# Patient Record
Sex: MALE | Race: WHITE | NOT HISPANIC OR LATINO
[De-identification: names, ages, dates, MRNs, and addresses within clinical notes are randomized per-mention and may not be internally consistent; named-entity substitution may affect disease eponyms.]

---

## 2017-06-30 ENCOUNTER — APPOINTMENT (OUTPATIENT)
Dept: GASTROENTEROLOGY | Facility: CLINIC | Age: 67
End: 2017-06-30
Payer: MEDICARE

## 2017-06-30 VITALS
HEART RATE: 55 BPM | OXYGEN SATURATION: 98 % | SYSTOLIC BLOOD PRESSURE: 136 MMHG | DIASTOLIC BLOOD PRESSURE: 72 MMHG | BODY MASS INDEX: 27 KG/M2 | WEIGHT: 172 LBS | HEIGHT: 67 IN | TEMPERATURE: 98.2 F

## 2017-06-30 DIAGNOSIS — Z12.11 ENCOUNTER FOR SCREENING FOR MALIGNANT NEOPLASM OF COLON: ICD-10-CM

## 2017-06-30 DIAGNOSIS — K62.5 HEMORRHAGE OF ANUS AND RECTUM: ICD-10-CM

## 2017-06-30 DIAGNOSIS — Z78.9 OTHER SPECIFIED HEALTH STATUS: ICD-10-CM

## 2017-06-30 DIAGNOSIS — Z86.010 PERSONAL HISTORY OF COLONIC POLYPS: ICD-10-CM

## 2017-06-30 DIAGNOSIS — Z86.39 PERSONAL HISTORY OF OTHER ENDOCRINE, NUTRITIONAL AND METABOLIC DISEASE: ICD-10-CM

## 2017-06-30 DIAGNOSIS — Z72.0 TOBACCO USE: ICD-10-CM

## 2017-06-30 DIAGNOSIS — Z81.8 FAMILY HISTORY OF OTHER MENTAL AND BEHAVIORAL DISORDERS: ICD-10-CM

## 2017-06-30 DIAGNOSIS — Z87.438 PERSONAL HISTORY OF OTHER DISEASES OF MALE GENITAL ORGANS: ICD-10-CM

## 2017-06-30 PROCEDURE — 99202 OFFICE O/P NEW SF 15 MIN: CPT

## 2017-06-30 RX ORDER — POLYETHYLENE GLYCOL-3350 AND ELECTROLYTES WITH FLAVOR PACK 240; 5.84; 2.98; 6.72; 22.72 G/278.26G; G/278.26G; G/278.26G; G/278.26G; G/278.26G
240 POWDER, FOR SOLUTION ORAL
Qty: 1 | Refills: 0 | Status: ACTIVE | COMMUNITY
Start: 2017-06-30 | End: 1900-01-01

## 2017-07-07 ENCOUNTER — APPOINTMENT (OUTPATIENT)
Dept: GASTROENTEROLOGY | Facility: AMBULATORY MEDICAL SERVICES | Age: 67
End: 2017-07-07

## 2017-07-13 ENCOUNTER — OTHER (OUTPATIENT)
Age: 67
End: 2017-07-13

## 2017-11-27 ENCOUNTER — APPOINTMENT (OUTPATIENT)
Dept: UROLOGY | Facility: CLINIC | Age: 67
End: 2017-11-27
Payer: MEDICARE

## 2017-11-27 VITALS
WEIGHT: 170 LBS | SYSTOLIC BLOOD PRESSURE: 148 MMHG | DIASTOLIC BLOOD PRESSURE: 88 MMHG | HEIGHT: 67 IN | HEART RATE: 70 BPM | OXYGEN SATURATION: 97 % | BODY MASS INDEX: 26.68 KG/M2

## 2017-11-27 DIAGNOSIS — M54.9 DORSALGIA, UNSPECIFIED: ICD-10-CM

## 2017-11-27 DIAGNOSIS — Z87.19 PERSONAL HISTORY OF OTHER DISEASES OF THE DIGESTIVE SYSTEM: ICD-10-CM

## 2017-11-27 DIAGNOSIS — Z81.8 FAMILY HISTORY OF OTHER MENTAL AND BEHAVIORAL DISORDERS: ICD-10-CM

## 2017-11-27 DIAGNOSIS — K64.9 UNSPECIFIED HEMORRHOIDS: ICD-10-CM

## 2017-11-27 DIAGNOSIS — F17.200 NICOTINE DEPENDENCE, UNSPECIFIED, UNCOMPLICATED: ICD-10-CM

## 2017-11-27 DIAGNOSIS — M25.50 PAIN IN UNSPECIFIED JOINT: ICD-10-CM

## 2017-11-27 DIAGNOSIS — R12 HEARTBURN: ICD-10-CM

## 2017-11-27 LAB
BILIRUB UR QL STRIP: NORMAL
CLARITY UR: CLEAR
COLLECTION METHOD: NORMAL
GLUCOSE UR-MCNC: NORMAL
HCG UR QL: 0.2 EU/DL
HGB UR QL STRIP.AUTO: NORMAL
KETONES UR-MCNC: NORMAL
LEUKOCYTE ESTERASE UR QL STRIP: NORMAL
NITRITE UR QL STRIP: NORMAL
PH UR STRIP: 5
PROT UR STRIP-MCNC: NORMAL
SP GR UR STRIP: 1.02

## 2017-11-27 PROCEDURE — 99214 OFFICE O/P EST MOD 30 MIN: CPT

## 2017-11-27 PROCEDURE — 81003 URINALYSIS AUTO W/O SCOPE: CPT | Mod: QW

## 2017-11-27 PROCEDURE — 51798 US URINE CAPACITY MEASURE: CPT

## 2017-11-29 LAB
APPEARANCE: ABNORMAL
BACTERIA UR CULT: NORMAL
BACTERIA: ABNORMAL
BILIRUBIN URINE: NEGATIVE
BLOOD URINE: ABNORMAL
COLOR: YELLOW
GLUCOSE QUALITATIVE U: NEGATIVE MG/DL
KETONES URINE: NEGATIVE
LEUKOCYTE ESTERASE URINE: NEGATIVE
MICROSCOPIC-UA: NORMAL
NITRITE URINE: NEGATIVE
PH URINE: 5.5
PROTEIN URINE: NEGATIVE MG/DL
RED BLOOD CELLS URINE: 2 /HPF
SPECIFIC GRAVITY URINE: 1.02
SQUAMOUS EPITHELIAL CELLS: 0 /HPF
URINE COMMENTS: NORMAL
UROBILINOGEN URINE: NEGATIVE MG/DL
WHITE BLOOD CELLS URINE: 0 /HPF

## 2018-05-11 ENCOUNTER — MESSAGE (OUTPATIENT)
Age: 68
End: 2018-05-11

## 2018-05-14 ENCOUNTER — APPOINTMENT (OUTPATIENT)
Dept: UROLOGY | Facility: CLINIC | Age: 68
End: 2018-05-14
Payer: MEDICARE

## 2018-05-14 PROCEDURE — 51798 US URINE CAPACITY MEASURE: CPT | Mod: 59

## 2018-05-14 PROCEDURE — 51741 ELECTRO-UROFLOWMETRY FIRST: CPT

## 2018-05-14 PROCEDURE — 99213 OFFICE O/P EST LOW 20 MIN: CPT

## 2018-06-04 ENCOUNTER — APPOINTMENT (OUTPATIENT)
Dept: UROLOGY | Facility: CLINIC | Age: 68
End: 2018-06-04
Payer: MEDICARE

## 2018-06-04 VITALS
HEART RATE: 76 BPM | BODY MASS INDEX: 26.68 KG/M2 | DIASTOLIC BLOOD PRESSURE: 89 MMHG | HEIGHT: 67 IN | SYSTOLIC BLOOD PRESSURE: 167 MMHG | WEIGHT: 170 LBS

## 2018-06-04 PROCEDURE — 99214 OFFICE O/P EST MOD 30 MIN: CPT

## 2018-11-29 ENCOUNTER — APPOINTMENT (OUTPATIENT)
Dept: UROLOGY | Facility: CLINIC | Age: 68
End: 2018-11-29
Payer: MEDICARE

## 2018-11-29 VITALS
WEIGHT: 170 LBS | HEIGHT: 67 IN | DIASTOLIC BLOOD PRESSURE: 85 MMHG | RESPIRATION RATE: 14 BRPM | SYSTOLIC BLOOD PRESSURE: 138 MMHG | HEART RATE: 73 BPM | BODY MASS INDEX: 26.68 KG/M2

## 2018-11-29 PROCEDURE — 99214 OFFICE O/P EST MOD 30 MIN: CPT

## 2018-11-29 RX ORDER — SILDENAFIL 20 MG/1
20 TABLET ORAL
Qty: 50 | Refills: 5 | Status: ACTIVE | COMMUNITY
Start: 2018-11-29 | End: 1900-01-01

## 2018-12-13 ENCOUNTER — APPOINTMENT (OUTPATIENT)
Dept: UROLOGY | Facility: CLINIC | Age: 68
End: 2018-12-13

## 2019-05-09 ENCOUNTER — APPOINTMENT (OUTPATIENT)
Dept: UROLOGY | Facility: CLINIC | Age: 69
End: 2019-05-09
Payer: MEDICARE

## 2019-05-09 VITALS
WEIGHT: 168 LBS | HEIGHT: 67 IN | HEART RATE: 67 BPM | RESPIRATION RATE: 14 BRPM | SYSTOLIC BLOOD PRESSURE: 129 MMHG | BODY MASS INDEX: 26.37 KG/M2 | DIASTOLIC BLOOD PRESSURE: 82 MMHG

## 2019-05-09 PROCEDURE — 99214 OFFICE O/P EST MOD 30 MIN: CPT

## 2019-05-14 NOTE — HISTORY OF PRESENT ILLNESS
[FreeTextEntry1] : 68 yo male presents for follow up. \par Taking Flomax- urinating well.  Urinates 3-4 x during day and nocturia of 1 x with reasonable flow.\par Denies dysuria, hematuria, lower abdominal or flank pain, fever, chills or rigors. \par Has Erectile dysfunction. Rates erections as 3/5. Reports normal libido. Uses Sildenafil 20 mg tabs.\par \par Initially seen for Elevated PSA. Patient from my previous practice.\par Has h/o BPH on Flomax and Elevated PSA s/p negative Prostate biopsy. \par No family history of Prostate cancer.\par

## 2019-05-14 NOTE — ASSESSMENT
[FreeTextEntry1] : Benign Prostatic Hyperplasia:\par Continue Flomax. \par \par Erectile dysfunction:\par Sildenafil PRN. \par \par Elevated PSA:\par PSA: 4.49(11/15)-->4.71(5/17)-->4.4(9/17)-->5.58(5/8/18)-->4.6(9/18)-->6.34(4/30/19). \par s/p negative Prostate biopsy in Jan 2016 for Elevated PSA of 4.91(12/15). \par MRI Prostate(5/23/18): no suspicious lesion. Prostate vol- 84 cc. Vol at the time of Prostate biopsy- 60 cc. \par Continue PSA monitoring. Will do PSA in months. If goes up further will do MRI Prostate.\par \par Return to office in 3 months or sooner if any issues.

## 2019-05-14 NOTE — LETTER BODY
[Sincerely,] : Sincerely, [Please see my note below.] : Please see my note below. [Dear  ___] : Dear  [unfilled], [FreeTextEntry3] : Sean Castro MD\par  of Urology\par [facility]\par \par Offices:\par [facility] @\par [address], [place] [address]\par and\par [address], [place] [address], [address]\par and\par [address] [address]\par \par TEL: [phone]\par FAX: [phone]

## 2019-08-09 LAB
PSA FREE FLD-MCNC: 28 %
PSA FREE SERPL-MCNC: 1.39 NG/ML
PSA SERPL-MCNC: 4.97 NG/ML

## 2019-11-07 ENCOUNTER — APPOINTMENT (OUTPATIENT)
Dept: UROLOGY | Facility: CLINIC | Age: 69
End: 2019-11-07
Payer: MEDICARE

## 2019-11-07 VITALS — RESPIRATION RATE: 14 BRPM | HEIGHT: 67 IN | BODY MASS INDEX: 21.5 KG/M2 | WEIGHT: 137 LBS | HEART RATE: 60 BPM

## 2019-11-07 PROCEDURE — 99214 OFFICE O/P EST MOD 30 MIN: CPT

## 2019-11-11 NOTE — LETTER BODY
[Courtesy Letter:] : I had the pleasure of seeing your patient, [unfilled], in my office today. [Dear  ___] : Dear  [unfilled], [Sincerely,] : Sincerely, [Please see my note below.] : Please see my note below. [FreeTextEntry3] : Sean Castro MD\par  of Urology\par [facility]\par \par Offices:\par [facility] @\par [address], [place] [address]\par and\par [address], [place] [address], [address]\par and\par [address] [address]\par \par TEL: [phone]\par FAX: [phone]

## 2019-11-11 NOTE — PHYSICAL EXAM
[Urethral Meatus] : meatus normal [Epididymis] : the epididymides were normal [Scrotum] : the scrotum was normal [Penis Abnormality] : normal circumcised penis [Testes Tenderness] : no tenderness of the testes [Testes Mass (___cm)] : there were no testicular masses [General Appearance - Well Developed] : well developed [Normal Appearance] : normal appearance [General Appearance - In No Acute Distress] : no acute distress [Abdomen Soft] : soft [Abdomen Tenderness] : non-tender [Abdomen Mass (___ Cm)] : no abdominal mass palpated [Costovertebral Angle Tenderness] : no ~M costovertebral angle tenderness [Skin Color & Pigmentation] : normal skin color and pigmentation [] : no respiratory distress [Oriented To Time, Place, And Person] : oriented to person, place, and time [Normal Station and Gait] : the gait and station were normal for the patient's age [No Focal Deficits] : no focal deficits [No Palpable Adenopathy] : no palpable adenopathy [FreeTextEntry1] : normal peripheral circulation

## 2019-11-11 NOTE — HISTORY OF PRESENT ILLNESS
[FreeTextEntry1] : 70 yo male presents for follow up. \par Taking Flomax- urinating well.  Urinates 3-4 x during day and nocturia of 1 x with reasonable flow.\par Denies dysuria, hematuria, lower abdominal or flank pain, fever, chills or rigors. \par Has Erectile dysfunction. Rates erections as 3/5. Reports normal libido\par Not using Sildenafil.\par \par Initially seen for Elevated PSA. Patient from my previous practice.\par Has h/o BPH on Flomax and Elevated PSA s/p negative Prostate biopsy. \par No family history of Prostate cancer.\par

## 2019-11-11 NOTE — ASSESSMENT
[FreeTextEntry1] : Benign Prostatic Hyperplasia:\par Discussed treatment options. \par Continue Flomax. \par \par Elevated PSA:\par Status post negative prostate biopsy Feb 2016. \par MRI Prostate((May 2018): \par Prostate volume- 84 cc. \par No suspicious lesion- PIRADS 1. \par PSA stable. Continue PSA monitoring. Repeat PSA In 6 months. \par \par Return to office in 6 months or sooner if any issues. \par \par

## 2020-06-11 LAB
PSA FREE FLD-MCNC: 28 %
PSA FREE SERPL-MCNC: 1.39 NG/ML
PSA SERPL-MCNC: 4.98 NG/ML

## 2020-06-22 ENCOUNTER — APPOINTMENT (OUTPATIENT)
Dept: UROLOGY | Facility: CLINIC | Age: 70
End: 2020-06-22
Payer: MEDICARE

## 2020-06-22 VITALS
HEIGHT: 66 IN | BODY MASS INDEX: 26.84 KG/M2 | OXYGEN SATURATION: 97 % | DIASTOLIC BLOOD PRESSURE: 91 MMHG | WEIGHT: 167 LBS | HEART RATE: 70 BPM | SYSTOLIC BLOOD PRESSURE: 171 MMHG | TEMPERATURE: 98 F

## 2020-06-22 PROCEDURE — 99213 OFFICE O/P EST LOW 20 MIN: CPT

## 2020-06-22 NOTE — LETTER BODY
[Dear  ___] : Dear  [unfilled], [Courtesy Letter:] : I had the pleasure of seeing your patient, [unfilled], in my office today. [Sincerely,] : Sincerely, [Please see my note below.] : Please see my note below. [FreeTextEntry3] : Sean Castro MD\par  of Urology\par [facility]\par \par Offices:\par The [facility] @\par [address], [place] [address]\par and\par [address] [facility], [place] [address], [address]\par and\par [address] [address]\par \par TEL: [phone]\par FAX: [phone]

## 2020-06-22 NOTE — PHYSICAL EXAM
[Normal Appearance] : normal appearance [General Appearance - In No Acute Distress] : no acute distress [Skin Color & Pigmentation] : normal skin color and pigmentation [FreeTextEntry1] : normal peripheral circulation [] : no respiratory distress [Normal Station and Gait] : the gait and station were normal for the patient's age [Oriented To Time, Place, And Person] : oriented to person, place, and time

## 2020-06-22 NOTE — HISTORY OF PRESENT ILLNESS
[FreeTextEntry1] : 69 yo male presents for follow up. \par Taking Flomax- urinating well.  Urinates 3-4 x during day and nocturia of 1 x with reasonable flow.\par Denies hesitancy, straining or urinary incontinence. \par Denies dysuria, hematuria, lower abdominal or flank pain, fever, chills or rigors. \par Not sexually active. Has Erectile dysfunction. Rates erections as 3/5. Reports normal libido\par \par Initially seen for Elevated PSA. Patient from my previous practice.\par Has h/o BPH on Flomax and Elevated PSA s/p negative Prostate biopsy. \par No family history of Prostate cancer.\par

## 2020-06-22 NOTE — ASSESSMENT
[FreeTextEntry1] : Benign Prostatic Hyperplasia:\par Continue Flomax. \par \par Elevated PSA:\par Status post negative prostate biopsy for elevated PSA of 4.91 in Feb 2016. \par MRI Prostate((May 2018): \par Prostate volume- 84 cc. \par No suspicious lesion- PIRADS 1. \par PSA stable. Continue PSA monitoring. Repeat PSA In 6 months. \par \par Return to office in 6 months or sooner if any issues- will do Uroflo and PVR.

## 2020-11-20 ENCOUNTER — LABORATORY RESULT (OUTPATIENT)
Age: 70
End: 2020-11-20

## 2020-12-01 ENCOUNTER — APPOINTMENT (OUTPATIENT)
Dept: UROLOGY | Facility: CLINIC | Age: 70
End: 2020-12-01
Payer: MEDICARE

## 2020-12-01 VITALS — HEART RATE: 66 BPM | SYSTOLIC BLOOD PRESSURE: 133 MMHG | DIASTOLIC BLOOD PRESSURE: 76 MMHG

## 2020-12-01 VITALS — TEMPERATURE: 97.7 F

## 2020-12-01 PROCEDURE — 99072 ADDL SUPL MATRL&STAF TM PHE: CPT

## 2020-12-01 PROCEDURE — 99214 OFFICE O/P EST MOD 30 MIN: CPT | Mod: 25

## 2020-12-01 PROCEDURE — 51798 US URINE CAPACITY MEASURE: CPT

## 2020-12-08 NOTE — ASSESSMENT
[FreeTextEntry1] : Benign Prostatic Hyperplasia:\par PVR: 0 ml. \par Discussed treatment options mainly: continued medical management with alpha blocker and or 5 alpha reductase inhibitor Vs Clean intermittent catheterization/Indwelling Berrios catheter Vs Surgery: Transurethral water vapor ablation/resection/vaporization of Prostate/Simple prostatectomy- open Vs robotic after appropriate work up.\par Hold off on Finasteride. \par Continue Flomax. \par \par Elevated PSA\par  PSA 3 months. Will inform results. If goes up will get MRI Prostate.\par \par Return to office in 6 months or sooner if any issues.

## 2020-12-08 NOTE — PHYSICAL EXAM
[Urethral Meatus] : meatus normal [Penis Abnormality] : normal circumcised penis [Scrotum] : the scrotum was normal [Epididymis] : the epididymides were normal [Testes Tenderness] : no tenderness of the testes [Testes Mass (___cm)] : there were no testicular masses [Normal Appearance] : normal appearance [General Appearance - In No Acute Distress] : no acute distress [Abdomen Soft] : soft [Abdomen Tenderness] : non-tender [Abdomen Mass (___ Cm)] : no abdominal mass palpated [Costovertebral Angle Tenderness] : no ~M costovertebral angle tenderness [Skin Color & Pigmentation] : normal skin color and pigmentation [FreeTextEntry1] : normal peripheral circulation [] : no respiratory distress [Oriented To Time, Place, And Person] : oriented to person, place, and time [Normal Station and Gait] : the gait and station were normal for the patient's age [No Focal Deficits] : no focal deficits

## 2020-12-08 NOTE — HISTORY OF PRESENT ILLNESS
[FreeTextEntry1] : 69 yo male presents for follow up. \par Taking Flomax. Urinates with reasonable stream, 3-4 x during day and nocturia of 1-2 x.\par Has off and on hesitancy, straining and intermittency .\par Denies dysuria, hematuria, lower abdominal or flank pain, fever, chills or rigors. \par Rates erections as 2/5, complaining of libido low \par Not sexually active. \par \par Initially seen for Elevated PSA. Patient from my previous practice.\par Has h/o BPH on Flomax and Elevated PSA s/p negative Prostate biopsy. \par No family history of Prostate cancer.\par

## 2020-12-08 NOTE — LETTER BODY
[Dear  ___] : Dear  [unfilled], [Courtesy Letter:] : I had the pleasure of seeing your patient, [unfilled], in my office today. [Please see my note below.] : Please see my note below. [Sincerely,] : Sincerely, [FreeTextEntry3] : Sean Castro MD\par  of Urology\par [facility]\par \par Offices:\par The [facility] @\par [address], [place] [address]\par and\par [address], [place] [address], [address]\par and\par [address] [address]\par \par TEL: [phone]\par FAX: [phone]

## 2021-02-22 ENCOUNTER — LABORATORY RESULT (OUTPATIENT)
Age: 71
End: 2021-02-22

## 2021-04-16 ENCOUNTER — LABORATORY RESULT (OUTPATIENT)
Age: 71
End: 2021-04-16

## 2021-04-21 ENCOUNTER — NON-APPOINTMENT (OUTPATIENT)
Age: 71
End: 2021-04-21

## 2021-06-01 ENCOUNTER — APPOINTMENT (OUTPATIENT)
Dept: UROLOGY | Facility: CLINIC | Age: 71
End: 2021-06-01

## 2021-06-15 ENCOUNTER — APPOINTMENT (OUTPATIENT)
Dept: UROLOGY | Facility: CLINIC | Age: 71
End: 2021-06-15
Payer: MEDICARE

## 2021-06-15 VITALS — TEMPERATURE: 97.8 F | HEART RATE: 60 BPM | SYSTOLIC BLOOD PRESSURE: 127 MMHG | DIASTOLIC BLOOD PRESSURE: 70 MMHG

## 2021-06-15 PROCEDURE — 99214 OFFICE O/P EST MOD 30 MIN: CPT

## 2021-06-16 LAB
APPEARANCE: CLEAR
BACTERIA: NEGATIVE
BILIRUBIN URINE: NEGATIVE
BLOOD URINE: NEGATIVE
CALCIUM OXALATE CRYSTALS: ABNORMAL
COLOR: NORMAL
GLUCOSE QUALITATIVE U: NEGATIVE
HYALINE CASTS: 0 /LPF
KETONES URINE: NEGATIVE
LEUKOCYTE ESTERASE URINE: NEGATIVE
MICROSCOPIC-UA: NORMAL
NITRITE URINE: NEGATIVE
PH URINE: 6
PROTEIN URINE: NEGATIVE
RED BLOOD CELLS URINE: 2 /HPF
SPECIFIC GRAVITY URINE: 1.02
SQUAMOUS EPITHELIAL CELLS: 0 /HPF
UROBILINOGEN URINE: NORMAL
WHITE BLOOD CELLS URINE: 1 /HPF

## 2021-06-17 LAB — BACTERIA UR CULT: NORMAL

## 2021-06-22 NOTE — PHYSICAL EXAM
[Normal Appearance] : normal appearance [General Appearance - In No Acute Distress] : no acute distress [Skin Color & Pigmentation] : normal skin color and pigmentation [FreeTextEntry1] : normal peripheral circulation [] : no respiratory distress [Oriented To Time, Place, And Person] : oriented to person, place, and time [Normal Station and Gait] : the gait and station were normal for the patient's age

## 2021-06-22 NOTE — ASSESSMENT
[FreeTextEntry1] : Reviewed outside records. \par Had Labs with [facility](6/8/21): \par PSA: 3.9\par Dip positive Microhematuria. \par \par Benign Prostatic Hyperplasia:\par Will get Urinalysis and Urine culture. \par \par Elevated PSA:\par MRI Prostate @ [facility](6/11/21):\par Prostate volume- 86 cc. \par No MRI suspicious lesion. PI-RADS 1. \par Total and Free PSA 1 week before next appointment. \par \par Return to office in 6 months or sooner if any issues- will do Uroflo/PVR.

## 2021-06-22 NOTE — HISTORY OF PRESENT ILLNESS
[FreeTextEntry1] : 70 yo male presents for follow up. \par Taking Flomax. Urinates with reasonable stream, 3-4 x during day and nocturia of 1-2 x.\par Has off and on hesitancy, straining and intermittency .\par Denies dysuria, hematuria, lower abdominal or flank pain, fever, chills or rigors. \par Rates erections as 2/5, complaining of libido low \par Not sexually active. \par Had MRI Prostate. \par \par Initially seen for Elevated PSA. Patient from my previous practice.\par Has h/o BPH on Flomax and Elevated PSA s/p negative Prostate biopsy. \par No family history of Prostate cancer.\par

## 2021-06-22 NOTE — LETTER BODY
[Dear  ___] : Dear  [unfilled], [Courtesy Letter:] : I had the pleasure of seeing your patient, [unfilled], in my office today. [Please see my note below.] : Please see my note below. [Sincerely,] : Sincerely, [FreeTextEntry3] : Sean Castro MD\par  of Urology\par [facility]\par \par Offices:\par [facility] @\par [address] [place], [place] [address]\par and\par [address], [place] [address], [address]\par and\par [address] [address]\par \par TEL: [phone]\par FAX: [phone]

## 2021-11-24 ENCOUNTER — LABORATORY RESULT (OUTPATIENT)
Age: 71
End: 2021-11-24

## 2021-11-30 ENCOUNTER — APPOINTMENT (OUTPATIENT)
Dept: UROLOGY | Facility: CLINIC | Age: 71
End: 2021-11-30
Payer: MEDICARE

## 2021-11-30 VITALS — TEMPERATURE: 98 F

## 2021-11-30 PROCEDURE — 51798 US URINE CAPACITY MEASURE: CPT

## 2021-11-30 PROCEDURE — 99214 OFFICE O/P EST MOD 30 MIN: CPT | Mod: 25

## 2021-11-30 NOTE — LETTER BODY
[Dear  ___] : Dear  [unfilled], [Courtesy Letter:] : I had the pleasure of seeing your patient, [unfilled], in my office today. [Please see my note below.] : Please see my note below. [Sincerely,] : Sincerely, [FreeTextEntry3] : Sean Castro MD\par  of Urology\par [facility]\par \par Offices:\par [facility] @\par [address], [place] [address]\par and\par [address] [facility], [place] [address], [address]\par and\par [address] [address]\par \par TEL: [phone]\par FAX: [phone]

## 2021-11-30 NOTE — PHYSICAL EXAM
[Urethral Meatus] : meatus normal [Penis Abnormality] : normal circumcised penis [Scrotum] : the scrotum was normal [Epididymis] : the epididymides were normal [Testes Tenderness] : no tenderness of the testes [Testes Mass (___cm)] : there were no testicular masses [Normal Appearance] : normal appearance [General Appearance - In No Acute Distress] : no acute distress [Abdomen Soft] : soft [Abdomen Tenderness] : non-tender [Costovertebral Angle Tenderness] : no ~M costovertebral angle tenderness [Skin Color & Pigmentation] : normal skin color and pigmentation [FreeTextEntry1] : normal peripheral circulation [] : no respiratory distress [Oriented To Time, Place, And Person] : oriented to person, place, and time [Normal Station and Gait] : the gait and station were normal for the patient's age [No Focal Deficits] : no focal deficits

## 2021-11-30 NOTE — HISTORY OF PRESENT ILLNESS
[FreeTextEntry1] : 72 yo male presents for follow up. \par Taking Flomax. Urinates with variable stream, 3-4 x during day and nocturia of 2-3 x.\par Has off and on hesitancy, straining and intermittency .\par Denies dysuria, hematuria, lower abdominal or flank pain, fever, chills or rigors. \par Rates erections as 2/5, complaining of libido low \par Not sexually active. \par \par \par Initially seen for Elevated PSA. Patient from my previous practice.\par Has h/o BPH on Flomax and Elevated PSA s/p negative Prostate biopsy. \par No family history of Prostate cancer.\par

## 2021-11-30 NOTE — ASSESSMENT
[FreeTextEntry1] : Benign Prostatic Hyperplasia: \par Discussed treatment options mainly: continued medical management with alpha blocker and or 5 alpha reductase inhibitor Vs Clean intermittent catheterization/Indwelling Berrios catheter Vs Surgery: Transurethral resection/vaporization/ablation of Prostate and Simple prostatectomy: open Vs robotic after appropriate work up.\par Also discussed emerging minimally invasive surgical therapies: Prostatic urethral lift (Urolift) and Water vapor thermal therapy (Rezum). \par Discussed risks and benefits of each.\par Will like to try Silodosin. \par Discussed similar side effect profile as Flomax with slightly increased incidence of retrograde ejaculation. \par \par Elevated PSA:\par Trended down. \par Will repeat in 6 months. \par \par Return to office in 3 months or sooner if any issues: will do Uroflo/PVR. \par \par \par

## 2022-03-01 ENCOUNTER — APPOINTMENT (OUTPATIENT)
Dept: UROLOGY | Facility: CLINIC | Age: 72
End: 2022-03-01
Payer: MEDICARE

## 2022-03-01 PROCEDURE — 51741 ELECTRO-UROFLOWMETRY FIRST: CPT

## 2022-03-01 PROCEDURE — 99213 OFFICE O/P EST LOW 20 MIN: CPT | Mod: 25

## 2022-03-01 PROCEDURE — 51798 US URINE CAPACITY MEASURE: CPT

## 2022-03-06 NOTE — PHYSICAL EXAM
[Normal Appearance] : normal appearance [General Appearance - In No Acute Distress] : no acute distress [FreeTextEntry1] : normal peripheral circulation [Oriented To Time, Place, And Person] : oriented to person, place, and time [] : no respiratory distress

## 2022-03-06 NOTE — ASSESSMENT
[FreeTextEntry1] : Elevated PSA:\par No new PSA. Will do PSA in May 2022.\par \par Benign Prostatic Hyperplasia:\par See Uroflo and PVR. Improved lower urinary tract symptoms and post void residual.\par Discussed treatment options. Will continue Silodosin. \par \par Return to office in 6 months or sooner if any issues: will do Uroflo/PVR.

## 2022-03-06 NOTE — HISTORY OF PRESENT ILLNESS
[FreeTextEntry1] : 73 yo male presents for follow up. \par Taking Silodosin, flow slightly better, 3-4 x during day and nocturia of 2 x.\par Denies dysuria, hematuria, lower abdominal or flank pain, fever, chills or rigors. \par Not sexually active. \par JOHNSON: 11/2021. \par \par In the past mentioned urinated with variable stream, 3-4 x during day and nocturia of 2-3 x.\par Had off and on hesitancy, straining and intermittency .\par Rated erections as 2/5, complained of libido low \par \par Initially seen for Elevated PSA. Patient from my previous practice.\par Has h/o BPH on Flomax and Elevated PSA s/p negative Prostate biopsy. \par No family history of Prostate cancer.\par

## 2022-04-29 ENCOUNTER — LABORATORY RESULT (OUTPATIENT)
Age: 72
End: 2022-04-29

## 2022-05-18 ENCOUNTER — APPOINTMENT (OUTPATIENT)
Dept: GASTROENTEROLOGY | Facility: CLINIC | Age: 72
End: 2022-05-18
Payer: MEDICARE

## 2022-05-18 DIAGNOSIS — Z86.010 PERSONAL HISTORY OF COLONIC POLYPS: ICD-10-CM

## 2022-05-18 PROCEDURE — 99214 OFFICE O/P EST MOD 30 MIN: CPT

## 2022-05-18 RX ORDER — SODIUM SULFATE, POTASSIUM SULFATE, MAGNESIUM SULFATE 17.5; 3.13; 1.6 G/ML; G/ML; G/ML
17.5-3.13-1.6 SOLUTION, CONCENTRATE ORAL
Qty: 1 | Refills: 0 | Status: ACTIVE | COMMUNITY
Start: 2022-05-18 | End: 1900-01-01

## 2022-05-18 NOTE — PHYSICAL EXAM
[General Appearance - Alert] : alert [General Appearance - In No Acute Distress] : in no acute distress [Sclera] : the sclera and conjunctiva were normal [Neck Appearance] : the appearance of the neck was normal [Auscultation Breath Sounds / Voice Sounds] : lungs were clear to auscultation bilaterally [Heart Rate And Rhythm] : heart rate was normal and rhythm regular [Heart Sounds] : normal S1 and S2 [Heart Sounds Gallop] : no gallops [Murmurs] : no murmurs [Heart Sounds Pericardial Friction Rub] : no pericardial rub [Full Pulse] : the pedal pulses are present [Edema] : there was no peripheral edema [Bowel Sounds] : normal bowel sounds [Abdomen Soft] : soft [Abdomen Tenderness] : non-tender [] : no hepato-splenomegaly [Abdomen Mass (___ Cm)] : no abdominal mass palpated [Normal Sphincter Tone] : normal sphincter tone [No Rectal Mass] : no rectal mass [Internal Hemorrhoid] : no internal hemorrhoids [External Hemorrhoid] : external hemorrhoids [Cervical Lymph Nodes Enlarged Posterior Bilaterally] : posterior cervical [Cervical Lymph Nodes Enlarged Anterior Bilaterally] : anterior cervical [Supraclavicular Lymph Nodes Enlarged Bilaterally] : supraclavicular [Axillary Lymph Nodes Enlarged Bilaterally] : axillary [Femoral Lymph Nodes Enlarged Bilaterally] : femoral [Inguinal Lymph Nodes Enlarged Bilaterally] : inguinal [No CVA Tenderness] : no ~M costovertebral angle tenderness [No Spinal Tenderness] : no spinal tenderness [Abnormal Walk] : normal gait [Nail Clubbing] : no clubbing  or cyanosis of the fingernails [Musculoskeletal - Swelling] : no joint swelling seen [Motor Tone] : muscle strength and tone were normal [Skin Color & Pigmentation] : normal skin color and pigmentation [Skin Turgor] : normal skin turgor [No Focal Deficits] : no focal deficits [Oriented To Time, Place, And Person] : oriented to person, place, and time [Impaired Insight] : insight and judgment were intact [Affect] : the affect was normal

## 2022-05-18 NOTE — CONSULT LETTER
[Dear  ___] : Dear  [unfilled], [Consult Letter:] : I had the pleasure of evaluating your patient, [unfilled]. [Please see my note below.] : Please see my note below. [Consult Closing:] : Thank you very much for allowing me to participate in the care of this patient.  If you have any questions, please do not hesitate to contact me. [Sincerely,] : Sincerely, [FreeTextEntry2] : Nilay Farley MD\par [address]\[place] [address]\par  [FreeTextEntry3] : Rivera Freeman MD\par

## 2022-05-18 NOTE — HISTORY OF PRESENT ILLNESS
[de-identified] : May 18, 2022 \par \par FELTON JONES, History of colon polyps\par \par Mr. ONUR URIBE 72 year is referred for colon cancer screening.  The patient denies any change in bowel movements, blood per rectum, abdominal, pelvic or rectal discomfort.   \par \par There is no family history of colon cancer or other gastrointestinal cancers.\par \par The patient denies any unexplained weight loss, fever chills or night sweats.\par \par There is no significant cardiac or pulmonary history.\par \par No complaints of chest pain, shortness of breath, palpitations, cough.\par \par The patient is feeling quite well.\par \par The patient is on no significant anticoagulant therapy or anti platelet therapy. \par \par No adverse reaction to anesthesia in the past.\par \par

## 2022-06-15 LAB — SARS-COV-2 N GENE NPH QL NAA+PROBE: NOT DETECTED

## 2022-06-16 ENCOUNTER — NON-APPOINTMENT (OUTPATIENT)
Age: 72
End: 2022-06-16

## 2022-06-16 ENCOUNTER — APPOINTMENT (OUTPATIENT)
Dept: GASTROENTEROLOGY | Facility: AMBULATORY MEDICAL SERVICES | Age: 72
End: 2022-06-16
Payer: MEDICARE

## 2022-06-16 DIAGNOSIS — Z86.19 PERSONAL HISTORY OF OTHER INFECTIOUS AND PARASITIC DISEASES: ICD-10-CM

## 2022-06-16 PROCEDURE — 45380 COLONOSCOPY AND BIOPSY: CPT | Mod: 33

## 2022-06-20 ENCOUNTER — NON-APPOINTMENT (OUTPATIENT)
Age: 72
End: 2022-06-20

## 2022-06-24 ENCOUNTER — NON-APPOINTMENT (OUTPATIENT)
Age: 72
End: 2022-06-24

## 2022-06-24 LAB
AFP-TM SERPL-MCNC: <1.8 NG/ML
ALBUMIN SERPL ELPH-MCNC: 4.6 G/DL
ALP BLD-CCNC: 111 U/L
ALT SERPL-CCNC: 26 U/L
AST SERPL-CCNC: 22 U/L
BASOPHILS # BLD AUTO: 0.05 K/UL
BASOPHILS NFR BLD AUTO: 0.6 %
BILIRUB DIRECT SERPL-MCNC: 0.2 MG/DL
BILIRUB INDIRECT SERPL-MCNC: 0.4 MG/DL
BILIRUB SERPL-MCNC: 0.6 MG/DL
EOSINOPHIL # BLD AUTO: 0.2 K/UL
EOSINOPHIL NFR BLD AUTO: 2.5 %
HCT VFR BLD CALC: 48.4 %
HCV RNA SERPL NAA+PROBE-LOG IU: NOT DETECTED LOGIU/ML
HEPC RNA INTERP: NOT DETECTED
HGB BLD-MCNC: 16.1 G/DL
IMM GRANULOCYTES NFR BLD AUTO: 0.4 %
INR PPP: 0.92 RATIO
LYMPHOCYTES # BLD AUTO: 2.98 K/UL
LYMPHOCYTES NFR BLD AUTO: 37.6 %
MAN DIFF?: NORMAL
MCHC RBC-ENTMCNC: 33.1 PG
MCHC RBC-ENTMCNC: 33.3 GM/DL
MCV RBC AUTO: 99.4 FL
MONOCYTES # BLD AUTO: 0.85 K/UL
MONOCYTES NFR BLD AUTO: 10.7 %
NEUTROPHILS # BLD AUTO: 3.81 K/UL
NEUTROPHILS NFR BLD AUTO: 48.2 %
PLATELET # BLD AUTO: 230 K/UL
PROT SERPL-MCNC: 7.1 G/DL
PT BLD: 10.8 SEC
RBC # BLD: 4.87 M/UL
RBC # FLD: 13.2 %
WBC # FLD AUTO: 7.92 K/UL

## 2022-08-29 ENCOUNTER — RX RENEWAL (OUTPATIENT)
Age: 72
End: 2022-08-29

## 2022-09-13 ENCOUNTER — APPOINTMENT (OUTPATIENT)
Dept: UROLOGY | Facility: CLINIC | Age: 72
End: 2022-09-13

## 2022-09-13 VITALS
WEIGHT: 162 LBS | DIASTOLIC BLOOD PRESSURE: 91 MMHG | TEMPERATURE: 97.7 F | BODY MASS INDEX: 26.15 KG/M2 | HEART RATE: 67 BPM | SYSTOLIC BLOOD PRESSURE: 166 MMHG

## 2022-09-13 PROCEDURE — 51741 ELECTRO-UROFLOWMETRY FIRST: CPT

## 2022-09-13 PROCEDURE — 99214 OFFICE O/P EST MOD 30 MIN: CPT | Mod: 25

## 2022-09-13 PROCEDURE — 51798 US URINE CAPACITY MEASURE: CPT

## 2022-09-16 LAB
PSA FREE FLD-MCNC: 25 %
PSA FREE SERPL-MCNC: 1.53 NG/ML
PSA SERPL-MCNC: 6.17 NG/ML

## 2022-09-18 NOTE — PHYSICAL EXAM
[Normal Appearance] : normal appearance [General Appearance - In No Acute Distress] : no acute distress [] : no respiratory distress [Oriented To Time, Place, And Person] : oriented to person, place, and time [FreeTextEntry1] : normal peripheral circulation

## 2022-09-18 NOTE — HISTORY OF PRESENT ILLNESS
[FreeTextEntry1] : 71 yo male presents for follow up. \par No new PSA.\par Taking Silodosin, flow slightly better, 3-4 x during day and nocturia of 2 x.\par Denies dysuria, hematuria, lower abdominal or flank pain, fever, chills or rigors. \par Not sexually active. \par JOHNSON: 11/2021. \par \par In the past mentioned urinated with variable stream, 3-4 x during day and nocturia of 2-3 x.\par Had off and on hesitancy, straining and intermittency .\par Rated erections as 2/5, complained of libido low \par \par Initially seen for Elevated PSA. Patient from my previous practice.\par Has h/o BPH on Flomax and Elevated PSA s/p negative Prostate biopsy. \par No family history of Prostate cancer.\par

## 2022-09-18 NOTE — ASSESSMENT
[FreeTextEntry1] : Elevated PSA:\par Total and Free PSA today and before follow up appointment. \par \par Benign Prostatic Hyperplasia:\par See Uroflo and PVR. \par Discussed treatment options, will continue Silodosin. \par \par Return to office in 6 months or sooner if any issues: will do Uroflo/PVR.

## 2023-02-27 ENCOUNTER — OFFICE (OUTPATIENT)
Dept: URBAN - METROPOLITAN AREA CLINIC 115 | Facility: CLINIC | Age: 73
Setting detail: OPHTHALMOLOGY
End: 2023-02-27
Payer: COMMERCIAL

## 2023-02-27 DIAGNOSIS — H25.11: ICD-10-CM

## 2023-02-27 DIAGNOSIS — H25.13: ICD-10-CM

## 2023-02-27 PROBLEM — H25.12 CATARACT SENILE NUCLEAR SCLEROSIS; RIGHT EYE, LEFT EYE, BOTH EYES: Status: ACTIVE | Noted: 2023-02-27

## 2023-02-27 PROCEDURE — 99204 OFFICE O/P NEW MOD 45 MIN: CPT | Performed by: OPHTHALMOLOGY

## 2023-02-27 PROCEDURE — 92136 OPHTHALMIC BIOMETRY: CPT | Performed by: OPHTHALMOLOGY

## 2023-02-27 ASSESSMENT — REFRACTION_AUTOREFRACTION
OS_CYLINDER: -1.75
OD_CYLINDER: -2.75
OS_AXIS: 056
OD_SPHERE: -2.00
OD_AXIS: 104
OS_SPHERE: 0.00

## 2023-02-27 ASSESSMENT — VISUAL ACUITY
OD_BCVA: 20/40-1
OS_BCVA: 20/150

## 2023-02-27 ASSESSMENT — SPHEQUIV_DERIVED
OS_SPHEQUIV: -0.875
OD_SPHEQUIV: -3.375

## 2023-02-27 ASSESSMENT — CONFRONTATIONAL VISUAL FIELD TEST (CVF)
OD_FINDINGS: FULL
OS_FINDINGS: FULL

## 2023-03-10 LAB
PSA FREE FLD-MCNC: 28 %
PSA FREE SERPL-MCNC: 1.59 NG/ML
PSA SERPL-MCNC: 5.75 NG/ML

## 2023-03-23 ENCOUNTER — ASC (OUTPATIENT)
Dept: URBAN - METROPOLITAN AREA SURGERY 8 | Facility: SURGERY | Age: 73
Setting detail: OPHTHALMOLOGY
End: 2023-03-23
Payer: COMMERCIAL

## 2023-03-23 DIAGNOSIS — H25.041: ICD-10-CM

## 2023-03-23 DIAGNOSIS — H25.11: ICD-10-CM

## 2023-03-23 PROCEDURE — 66984 XCAPSL CTRC RMVL W/O ECP: CPT | Performed by: OPHTHALMOLOGY

## 2023-03-24 ENCOUNTER — RX ONLY (RX ONLY)
Age: 73
End: 2023-03-24

## 2023-03-24 ENCOUNTER — OFFICE (OUTPATIENT)
Dept: URBAN - METROPOLITAN AREA CLINIC 115 | Facility: CLINIC | Age: 73
Setting detail: OPHTHALMOLOGY
End: 2023-03-24
Payer: COMMERCIAL

## 2023-03-24 DIAGNOSIS — H25.12: ICD-10-CM

## 2023-03-24 DIAGNOSIS — Z96.1: ICD-10-CM

## 2023-03-24 PROCEDURE — 99024 POSTOP FOLLOW-UP VISIT: CPT | Performed by: OPHTHALMOLOGY

## 2023-03-24 ASSESSMENT — REFRACTION_AUTOREFRACTION
OD_SPHERE: +2.00
OD_CYLINDER: -1.25
OS_CYLINDER: -2.00
OD_AXIS: 126
OS_AXIS: 052
OS_SPHERE: -0.25

## 2023-03-24 ASSESSMENT — CONFRONTATIONAL VISUAL FIELD TEST (CVF)
OD_FINDINGS: FULL
OS_FINDINGS: FULL

## 2023-03-24 ASSESSMENT — VISUAL ACUITY
OS_BCVA: 20/30-1
OD_BCVA: 20/30

## 2023-03-24 ASSESSMENT — SPHEQUIV_DERIVED
OD_SPHEQUIV: 1.375
OS_SPHEQUIV: -1.25

## 2023-04-07 ENCOUNTER — OFFICE (OUTPATIENT)
Dept: URBAN - METROPOLITAN AREA CLINIC 115 | Facility: CLINIC | Age: 73
Setting detail: OPHTHALMOLOGY
End: 2023-04-07
Payer: COMMERCIAL

## 2023-04-07 DIAGNOSIS — Z96.1: ICD-10-CM

## 2023-04-07 PROCEDURE — 99024 POSTOP FOLLOW-UP VISIT: CPT | Performed by: OPTOMETRIST

## 2023-04-07 ASSESSMENT — VISUAL ACUITY
OD_BCVA: 20/40
OS_BCVA: 20/40

## 2023-04-07 ASSESSMENT — REFRACTION_AUTOREFRACTION
OD_AXIS: 105
OD_SPHERE: +1.75
OS_AXIS: 056
OS_CYLINDER: -2.50
OD_CYLINDER: -2.75
OS_SPHERE: 0.00

## 2023-04-07 ASSESSMENT — SPHEQUIV_DERIVED
OD_SPHEQUIV: 0.375
OS_SPHEQUIV: -1.25

## 2023-04-27 ENCOUNTER — ASC (OUTPATIENT)
Dept: URBAN - METROPOLITAN AREA SURGERY 8 | Facility: SURGERY | Age: 73
Setting detail: OPHTHALMOLOGY
End: 2023-04-27
Payer: MEDICARE

## 2023-04-27 DIAGNOSIS — H25.12: ICD-10-CM

## 2023-04-27 PROCEDURE — 66984 XCAPSL CTRC RMVL W/O ECP: CPT | Performed by: OPHTHALMOLOGY

## 2023-04-28 ENCOUNTER — OFFICE (OUTPATIENT)
Dept: URBAN - METROPOLITAN AREA CLINIC 115 | Facility: CLINIC | Age: 73
Setting detail: OPHTHALMOLOGY
End: 2023-04-28
Payer: MEDICARE

## 2023-04-28 ENCOUNTER — RX ONLY (RX ONLY)
Age: 73
End: 2023-04-28

## 2023-04-28 DIAGNOSIS — Z96.1: ICD-10-CM

## 2023-04-28 PROCEDURE — 99024 POSTOP FOLLOW-UP VISIT: CPT | Performed by: OPTOMETRIST

## 2023-04-28 ASSESSMENT — CONFRONTATIONAL VISUAL FIELD TEST (CVF)
OD_FINDINGS: FULL
OS_FINDINGS: FULL

## 2023-04-28 ASSESSMENT — TONOMETRY: OD_IOP_MMHG: 19

## 2023-04-28 ASSESSMENT — CORNEAL EDEMA - FOLDS/STRIAE: OS_FOLDSSTRIAE: 1+

## 2023-04-28 ASSESSMENT — SPHEQUIV_DERIVED: OD_SPHEQUIV: 0.875

## 2023-04-28 ASSESSMENT — REFRACTION_AUTOREFRACTION
OD_CYLINDER: -2.25
OS_SPHERE: UTP
OD_SPHERE: +2.00
OD_AXIS: 101

## 2023-04-28 ASSESSMENT — VISUAL ACUITY
OS_BCVA: 20/30-2
OD_BCVA: 20/100

## 2023-05-02 ENCOUNTER — APPOINTMENT (OUTPATIENT)
Dept: UROLOGY | Facility: CLINIC | Age: 73
End: 2023-05-02
Payer: MEDICARE

## 2023-05-02 VITALS
HEIGHT: 67 IN | DIASTOLIC BLOOD PRESSURE: 77 MMHG | BODY MASS INDEX: 25.43 KG/M2 | TEMPERATURE: 98 F | HEART RATE: 71 BPM | WEIGHT: 162 LBS | RESPIRATION RATE: 14 BRPM | SYSTOLIC BLOOD PRESSURE: 128 MMHG

## 2023-05-02 DIAGNOSIS — N52.9 MALE ERECTILE DYSFUNCTION, UNSPECIFIED: ICD-10-CM

## 2023-05-02 DIAGNOSIS — N39.41 URGE INCONTINENCE: ICD-10-CM

## 2023-05-02 PROCEDURE — 99214 OFFICE O/P EST MOD 30 MIN: CPT

## 2023-05-05 ENCOUNTER — OFFICE (OUTPATIENT)
Dept: URBAN - METROPOLITAN AREA CLINIC 115 | Facility: CLINIC | Age: 73
Setting detail: OPHTHALMOLOGY
End: 2023-05-05
Payer: MEDICARE

## 2023-05-05 DIAGNOSIS — Z96.1: ICD-10-CM

## 2023-05-05 LAB
APPEARANCE: CLEAR
BILIRUBIN URINE: NEGATIVE
BLOOD URINE: NEGATIVE
COLOR: YELLOW
GLUCOSE QUALITATIVE U: NEGATIVE MG/DL
KETONES URINE: NEGATIVE MG/DL
LEUKOCYTE ESTERASE URINE: NEGATIVE
NITRITE URINE: NEGATIVE
PH URINE: 6
PROTEIN URINE: NEGATIVE MG/DL
SPECIFIC GRAVITY URINE: 1.01
UROBILINOGEN URINE: 0.2 MG/DL

## 2023-05-05 PROCEDURE — 99024 POSTOP FOLLOW-UP VISIT: CPT | Performed by: OPTOMETRIST

## 2023-05-05 ASSESSMENT — CORNEAL EDEMA - FOLDS/STRIAE: OS_FOLDSSTRIAE: ABSENT

## 2023-05-05 ASSESSMENT — REFRACTION_AUTOREFRACTION
OS_AXIS: 088
OS_SPHERE: +1.25
OS_CYLINDER: -1.25
OD_CYLINDER: -2.25
OD_SPHERE: +1.75
OD_AXIS: 105

## 2023-05-05 ASSESSMENT — VISUAL ACUITY
OD_BCVA: 20/25
OS_BCVA: 20/30-

## 2023-05-05 ASSESSMENT — TONOMETRY: OD_IOP_MMHG: 19

## 2023-05-05 ASSESSMENT — SPHEQUIV_DERIVED
OS_SPHEQUIV: 0.625
OD_SPHEQUIV: 0.625

## 2023-05-08 PROBLEM — N52.9 ERECTILE DYSFUNCTION: Status: ACTIVE | Noted: 2017-06-30

## 2023-05-08 PROBLEM — N52.9 ORGANIC ERECTILE DYSFUNCTION: Noted: 2017-11-27

## 2023-05-08 PROBLEM — N39.41 URGE INCONTINENCE: Status: ACTIVE | Noted: 2023-05-08

## 2023-05-08 NOTE — LETTER BODY
[Dear  ___] : Dear  [unfilled], [Courtesy Letter:] : I had the pleasure of seeing your patient, [unfilled], in my office today. [Please see my note below.] : Please see my note below. [Sincerely,] : Sincerely, [FreeTextEntry3] : Sean Castro MD\par  of Urology\par [facility]\par \par [facility]\par Offices:\par [address] [facility], [facility]\par [address] [address]\par [address] [facility], [address]\par \par TEL: [phone]\par FAX: [phone]

## 2023-05-08 NOTE — ASSESSMENT
[FreeTextEntry1] : Reviewed records.\par Discussed labs. \par \par Benign Prostatic Hyperplasia:\par PVR: 1st 310 ml, 2nd 50 ml. \par Discussed treatment options, will continue Silodosin. \par \par Urge incontinence:\par Gave samples of Myrbetriq. \par Explained common side effects: HTN, urinary retention, nasopharyngitis, headache, tachycardia. \par Asked to update us in a few weeks. If helps will do prescription. \par \par Elevated PSA:\par PSA stable. Continue PSA monitoring every 6 months. \par \par Return to office in 3 months or sooner if any issues: will do Uroflo/PVR.

## 2023-05-08 NOTE — HISTORY OF PRESENT ILLNESS
[FreeTextEntry1] : 74 yo male presents for follow up. \par No new PSA.\par Taking Silodosin, has reasonable stream, daytime frequency every 1 to 2 hours 3-4 x and nocturia of 2 to 3 x.\par Has on and off urgency and urinary incontinence 1 x day. \par Denies hesitancy, straining, intermittency and sense of incomplete emptying. \par Denies dysuria, hematuria, lower abdominal or flank pain, fever, chills or rigors. \par Not sexually active. \par \par MRI Prostate(June 2021) for Elevated PSA: 6.27/33%. \par Prostate volume: 86 cc. \par No MRI suspicious lesions. PIRADS 1. \par \par JOHNSON: Today, 5/2/23. \par \par \par In the past mentioned urinated with variable stream, 3-4 x during day and nocturia of 2-3 x.\par Had off and on hesitancy, straining and intermittency .\par Rated erections as 2/5, complained of libido low \par \par Initially seen for Elevated PSA. Patient from my previous practice.\par Has h/o BPH on Flomax and Elevated PSA s/p negative Prostate biopsy. \par No family history of Prostate cancer.\par

## 2023-05-08 NOTE — PHYSICAL EXAM
[Urethral Meatus] : meatus normal [Penis Abnormality] : normal circumcised penis [Scrotum] : the scrotum was normal [Epididymis] : the epididymides were normal [Testes Tenderness] : no tenderness of the testes [Testes Mass (___cm)] : there were no testicular masses [Normal Appearance] : normal appearance [General Appearance - In No Acute Distress] : no acute distress [Abdomen Soft] : soft [Abdomen Tenderness] : non-tender [] : no respiratory distress [Oriented To Time, Place, And Person] : oriented to person, place, and time [Normal Station and Gait] : the gait and station were normal for the patient's age [FreeTextEntry1] : normal peripheral circulation

## 2023-05-26 ENCOUNTER — OFFICE (OUTPATIENT)
Dept: URBAN - METROPOLITAN AREA CLINIC 115 | Facility: CLINIC | Age: 73
Setting detail: OPHTHALMOLOGY
End: 2023-05-26
Payer: MEDICARE

## 2023-05-26 DIAGNOSIS — H52.4: ICD-10-CM

## 2023-05-26 DIAGNOSIS — Z96.1: ICD-10-CM

## 2023-05-26 PROCEDURE — 99024 POSTOP FOLLOW-UP VISIT: CPT | Performed by: OPTOMETRIST

## 2023-05-26 ASSESSMENT — REFRACTION_MANIFEST
OS_CYLINDER: -1.00
OD_CYLINDER: -1.25
OS_SPHERE: +0.75
OS_AXIS: 084
OS_VA1: 20/20
OD_VA1: 20/20-1
OD_AXIS: 102
OD_SPHERE: +1.00
OD_ADD: +3.00
OS_ADD: +3.00

## 2023-05-26 ASSESSMENT — REFRACTION_AUTOREFRACTION
OD_CYLINDER: -2.00
OS_CYLINDER: -1.25
OD_AXIS: 102
OD_SPHERE: +1.50
OS_SPHERE: +1.00
OS_AXIS: 084

## 2023-05-26 ASSESSMENT — CORNEAL EDEMA - FOLDS/STRIAE: OS_FOLDSSTRIAE: ABSENT

## 2023-05-26 ASSESSMENT — TONOMETRY
OS_IOP_MMHG: 17
OD_IOP_MMHG: 19

## 2023-05-26 ASSESSMENT — VISUAL ACUITY
OS_BCVA: 20/30+1
OD_BCVA: 20/25-1

## 2023-05-26 ASSESSMENT — SPHEQUIV_DERIVED
OS_SPHEQUIV: 0.25
OD_SPHEQUIV: 0.375
OS_SPHEQUIV: 0.375
OD_SPHEQUIV: 0.5

## 2023-08-29 ENCOUNTER — APPOINTMENT (OUTPATIENT)
Dept: UROLOGY | Facility: CLINIC | Age: 73
End: 2023-08-29
Payer: MEDICARE

## 2023-08-29 PROCEDURE — 99214 OFFICE O/P EST MOD 30 MIN: CPT

## 2023-08-29 RX ORDER — SILODOSIN 8 MG/1
8 CAPSULE ORAL
Qty: 90 | Refills: 3 | Status: ACTIVE | COMMUNITY
Start: 2021-11-30 | End: 1900-01-01

## 2023-08-30 NOTE — HISTORY OF PRESENT ILLNESS
[FreeTextEntry1] : 8/29/2023:  73 year old male presents for follow up.  Taking silodosin, has reasonable stream, daytime frequency every 1-2 hours in the morning and then 3 to 4 times and nocturnal 2 to 3 times.  Has on and off urgency and urine continence once a day.  No new PSA.  Digital rectal examination was done on 5/2/23.  PSA: 6.27/33% (4/16/2021).  MRI Prostate (June 2021).  Prostate volume: 86 cc.  No MRI suspicious lesions. PIRADS 1.   In the past mentioned urinated with variable stream, 3-4 x during day and nocturia of 2-3 x. Had off and on hesitancy, straining and intermittency . Rated erections as 2/5, complained of libido low   Initially seen for Elevated PSA. Patient from my previous practice. Has h/o BPH on Flomax and Elevated PSA s/p negative Prostate biopsy.  No family history of Prostate cancer.

## 2023-08-30 NOTE — END OF VISIT
[FreeTextEntry3] : Documented by Macey Welch acting as a scribe for Dr. Sean Castro. 8/29/23   All medical record entries made by the Scribe were at my, Dr. Sean Castro, direction and personally dictated by me on 8/29/23. I have reviewed the chart and agree that the record accurately reflects my personal performance of the history, physical exam, assessment and plan. I have also personally directed, reviewed, and agreed with the chart. [Time Spent: ___ minutes] : I have spent [unfilled] minutes of time on the encounter.

## 2023-08-30 NOTE — LETTER BODY
[Dear  ___] : Dear  [unfilled], [Courtesy Letter:] : I had the pleasure of seeing your patient, [unfilled], in my office today. [Please see my note below.] : Please see my note below. [Sincerely,] : Sincerely, [FreeTextEntry3] : Sean Castro MD  of Urology [facility]  [facility] Offices: [address] [facility], [address] [place] [address], [facility], [facility] [address] [facility], [place] [address]  TEL: [phone] FAX: [phone]

## 2023-09-07 LAB
PSA FREE FLD-MCNC: 26 %
PSA FREE SERPL-MCNC: 1.56 NG/ML
PSA SERPL-MCNC: 5.92 NG/ML

## 2024-02-06 ENCOUNTER — LABORATORY RESULT (OUTPATIENT)
Age: 74
End: 2024-02-06

## 2024-02-27 ENCOUNTER — APPOINTMENT (OUTPATIENT)
Dept: UROLOGY | Facility: CLINIC | Age: 74
End: 2024-02-27
Payer: MEDICARE

## 2024-02-27 VITALS
HEIGHT: 67 IN | DIASTOLIC BLOOD PRESSURE: 107 MMHG | WEIGHT: 155 LBS | HEART RATE: 81 BPM | BODY MASS INDEX: 24.33 KG/M2 | SYSTOLIC BLOOD PRESSURE: 168 MMHG

## 2024-02-27 PROCEDURE — 99214 OFFICE O/P EST MOD 30 MIN: CPT

## 2024-02-27 NOTE — END OF VISIT
[Time Spent: ___ minutes] : I have spent [unfilled] minutes of time on the encounter. [FreeTextEntry3] : Documented by Macey Welch acting as a scribe for Dr. Sean Castro. 8/29/23   All medical record entries made by the Scribe were at my, Dr. Sean Castro, direction and personally dictated by me on 8/29/23. I have reviewed the chart and agree that the record accurately reflects my personal performance of the history, physical exam, assessment and plan. I have also personally directed, reviewed, and agreed with the chart.

## 2024-03-03 NOTE — LETTER BODY
[Dear  ___] : Dear  [unfilled], [Courtesy Letter:] : I had the pleasure of seeing your patient, [unfilled], in my office today. [Please see my note below.] : Please see my note below. [Sincerely,] : Sincerely, [FreeTextEntry3] : Sean Castro MD  of Urology [facility]  The [facility] Offices: [address] [facility], [address] [place] [address], [facility] [address] [facility], [place] [address]  TEL: [phone] FAX: [phone]

## 2024-03-03 NOTE — HISTORY OF PRESENT ILLNESS
[FreeTextEntry1] : Primary care physician: Dr Nilay Farley, [facility].   73-year-old male presents for follow up.  Taking Silodosin, has reasonable stream, daytime frequency every 1-2 hours in the morning and then 3 to 4 times and nocturia 2 to 3 times.  Endorses on and off intermittency and straining.  Has on and off urgency and urinary incontinence. Denies dysuria, hematuria, lower abdominal or flank pain, nausea, vomiting, fever, chills or rigors.  Not sexually active.   Status post negative Prostate biopsy in Jan 2016 for elevated PSA of 4.91(12/15).  PSA: 6.27/33% (4/16/2021).  MRI Prostate (June 2021).  Prostate volume: 86 cc.  No MRI suspicious lesions. PIRADS 1.   JOHNSON: 5/2/23.  Initially seen for Elevated PSA. Patient from my previous practice. Has h/o BPH on Flomax and Elevated PSA s/p negative Prostate biopsy.  No family history of Prostate cancer.

## 2024-03-03 NOTE — ASSESSMENT
[FreeTextEntry1] : Reviewed records. Discussed labs and imaging.   Elevated PSA: Will get MRI Prostate for possible prostate biopsy planning.   Benign Prostatic Hyperplasia: Discussed treatment options: continued medical management with alpha blocker and or 5 alpha reductase inhibitors with or without Clean intermittent catheterization/Indwelling Berrios catheter Vs Surgery: Transurethral resection/vaporization/ablation including Aquablation of Prostate and Simple prostatectomy: open Vs robotic after appropriate work up. Also discussed emerging minimally invasive surgical therapies: Prostatic urethral lift (Urolift), Water vapor thermal therapy (Rezum) and Transurethral anterior prostate commissurotomy and drug delivery (Optilume).  Discussed risks and benefits of each. Patient will consider his options. Would like to proceed with further work up.  Will continue Sildenafil for now.    Return to office after MRI: will do uroflow and check post void residual and Cystoscopy.

## 2024-04-02 ENCOUNTER — APPOINTMENT (OUTPATIENT)
Dept: UROLOGY | Facility: CLINIC | Age: 74
End: 2024-04-02
Payer: MEDICARE

## 2024-04-02 VITALS
SYSTOLIC BLOOD PRESSURE: 118 MMHG | HEIGHT: 67 IN | HEART RATE: 62 BPM | DIASTOLIC BLOOD PRESSURE: 75 MMHG | BODY MASS INDEX: 24.33 KG/M2 | WEIGHT: 155 LBS

## 2024-04-02 DIAGNOSIS — N13.8 BENIGN PROSTATIC HYPERPLASIA WITH LOWER URINARY TRACT SYMPMS: ICD-10-CM

## 2024-04-02 DIAGNOSIS — N40.1 BENIGN PROSTATIC HYPERPLASIA WITH LOWER URINARY TRACT SYMPMS: ICD-10-CM

## 2024-04-02 DIAGNOSIS — R97.20 ELEVATED PROSTATE, SPECIFIC ANTIGEN [PSA]: ICD-10-CM

## 2024-04-02 PROCEDURE — 99214 OFFICE O/P EST MOD 30 MIN: CPT

## 2024-04-08 PROBLEM — N40.1 BPH WITH OBSTRUCTION/LOWER URINARY TRACT SYMPTOMS: Status: ACTIVE | Noted: 2017-11-27

## 2024-04-08 NOTE — HISTORY OF PRESENT ILLNESS
[FreeTextEntry1] : Primary care physician: Dr Nilay Farley, [facility].   74-year-old male presents for cystoscopy. Patient considered his options and would like to continue medical therapy for now. Had MRI prostate.  Seen on 2/27/2024. On Silodosin, had reasonable stream, daytime frequency every 1-2 hours in the morning and then 3 to 4 times and nocturia 2 to 3 times.  Endorsed on and off intermittency and straining.  Had on and off urgency and urinary incontinence. Denied dysuria, hematuria, lower abdominal or flank pain, nausea, vomiting, fever, chills or rigors.  Not sexually active.   PSA: 6.27/33% (4/16/2021).  MRI Prostate (June 2021).  Prostate volume: 86 cc.  No MRI suspicious lesions. PIRADS 1.   Status post negative Prostate biopsy in Jan 2016 for elevated PSA of 4.91 (12/15).  JOHNSON: 5/2/23.  Initially seen for Elevated PSA. Patient from my previous practice. Has history of BPH on Flomax and Elevated PSA status post negative Prostate biopsy.  No family history of Prostate cancer.

## 2024-04-08 NOTE — LETTER BODY
[Dear  ___] : Dear  [unfilled], [Courtesy Letter:] : I had the pleasure of seeing your patient, [unfilled], in my office today. [Please see my note below.] : Please see my note below. [Sincerely,] : Sincerely, [FreeTextEntry3] : Sean Castro MD  of Urology [facility]  [facility] Offices: [address] [facility], [address] [place] [address], [facility] [address] [facility], [place] [address]  TEL: [phone] FAX: [phone]

## 2024-04-08 NOTE — ASSESSMENT
[FreeTextEntry1] : Reviewed records. Discussed labs and imaging.   Elevated PSA: PSA: 6.03/26% (2/6/2024). MRI Prostate (3/8/2024): Prostate volume: 93 cc. No findings suspicious for clinically significant prostate cancer.  PIRADS 1. Will continue PSA monitoring: Total and Free PSA 1 week before next appointment.   Benign Prostatic Hyperplasia: Discussed treatment options. Will continue Silodosin for now and possibly get Optilume in the future.  Will hold off on cystoscopy for now.   Return to office in 6 months or sooner if any issues: will do uroflow and check post void residual.

## 2024-09-26 ENCOUNTER — LABORATORY RESULT (OUTPATIENT)
Age: 74
End: 2024-09-26

## 2024-10-08 DIAGNOSIS — R97.20 ELEVATED PROSTATE, SPECIFIC ANTIGEN [PSA]: ICD-10-CM

## 2024-10-22 ENCOUNTER — OFFICE (OUTPATIENT)
Dept: URBAN - METROPOLITAN AREA CLINIC 115 | Facility: CLINIC | Age: 74
Setting detail: OPHTHALMOLOGY
End: 2024-10-22
Payer: MEDICARE

## 2024-10-22 DIAGNOSIS — Z96.1: ICD-10-CM

## 2024-10-22 DIAGNOSIS — H18.503: ICD-10-CM

## 2024-10-22 PROCEDURE — 92014 COMPRE OPH EXAM EST PT 1/>: CPT | Performed by: OPHTHALMOLOGY

## 2024-10-22 ASSESSMENT — REFRACTION_MANIFEST
OS_VA1: 20/20
OD_CYLINDER: -1.25
OS_AXIS: 084
OS_CYLINDER: -1.00
OD_VA1: 20/20-1
OS_ADD: +3.00
OD_AXIS: 102
OS_SPHERE: +0.75
OD_SPHERE: +1.00
OD_ADD: +3.00

## 2024-10-22 ASSESSMENT — REFRACTION_CURRENTRX
OS_OVR_VA: 20/
OD_CYLINDER: -2.00
OS_ADD: +2.00
OD_SPHERE: +1.50
OD_AXIS: 107
OD_ADD: +2.00
OS_AXIS: 087
OS_SPHERE: +0.75
OS_VPRISM_DIRECTION: PROGS
OD_VPRISM_DIRECTION: PROGS
OD_OVR_VA: 20/
OS_CYLINDER: -2.00

## 2024-10-22 ASSESSMENT — REFRACTION_AUTOREFRACTION
OD_AXIS: 103
OD_SPHERE: +1.00
OD_CYLINDER: -1.50
OS_CYLINDER: -1.75
OS_SPHERE: +1.00
OS_AXIS: 075

## 2024-10-22 ASSESSMENT — CONFRONTATIONAL VISUAL FIELD TEST (CVF)
OS_FINDINGS: FULL
OD_FINDINGS: FULL

## 2024-10-22 ASSESSMENT — VISUAL ACUITY
OD_BCVA: 20/25-1
OS_BCVA: 2025-2

## 2024-10-22 ASSESSMENT — TONOMETRY
OS_IOP_MMHG: 16
OD_IOP_MMHG: 17

## 2024-12-03 ENCOUNTER — APPOINTMENT (OUTPATIENT)
Dept: UROLOGY | Facility: CLINIC | Age: 74
End: 2024-12-03
Payer: MEDICARE

## 2024-12-03 VITALS
HEART RATE: 67 BPM | SYSTOLIC BLOOD PRESSURE: 160 MMHG | BODY MASS INDEX: 23.18 KG/M2 | DIASTOLIC BLOOD PRESSURE: 84 MMHG | WEIGHT: 148 LBS

## 2024-12-03 DIAGNOSIS — R97.20 ELEVATED PROSTATE, SPECIFIC ANTIGEN [PSA]: ICD-10-CM

## 2024-12-03 DIAGNOSIS — N40.1 BENIGN PROSTATIC HYPERPLASIA WITH LOWER URINARY TRACT SYMPMS: ICD-10-CM

## 2024-12-03 DIAGNOSIS — N13.8 BENIGN PROSTATIC HYPERPLASIA WITH LOWER URINARY TRACT SYMPMS: ICD-10-CM

## 2024-12-03 PROCEDURE — 51741 ELECTRO-UROFLOWMETRY FIRST: CPT

## 2024-12-03 PROCEDURE — 99214 OFFICE O/P EST MOD 30 MIN: CPT | Mod: 25

## 2025-06-10 ENCOUNTER — APPOINTMENT (OUTPATIENT)
Dept: UROLOGY | Facility: CLINIC | Age: 75
End: 2025-06-10
Payer: MEDICARE

## 2025-06-10 VITALS
DIASTOLIC BLOOD PRESSURE: 83 MMHG | HEART RATE: 60 BPM | BODY MASS INDEX: 23.49 KG/M2 | WEIGHT: 150 LBS | SYSTOLIC BLOOD PRESSURE: 149 MMHG

## 2025-06-10 PROCEDURE — 99214 OFFICE O/P EST MOD 30 MIN: CPT
